# Patient Record
(demographics unavailable — no encounter records)

---

## 2025-06-04 NOTE — HISTORY OF PRESENT ILLNESS
[de-identified] : 65 year old black male referred by Dr Costa for monoclonal gammopathy , PMH of hypertension , hyperlipidemia , renal stones ? , CKD noted with igG( lambda ) paraproteinemia present for at least one year . M spike August 2022 : 1.8  Feb 2023 : 1.2  October 2023 : 1.2 , normal igA and igM . normal free light chain ratio.  Hgb > 12  He denies bone pain , B symptoms . recent CT abdomen showed renal cyst .  social history : retired , social alcohol  FH : father had lung cancer Surgery ; appendectomy . [de-identified] : 3/5/24: Pt presents for a follow-up regarding his monoclonal gammopathy. He underwent MR T/LS spine to r/o lytic lesions and were negative but showed multiple disc bulge. He continues to see Dr. Costa regularly and reports stable kidney functions. He has no new complaints today. He further denies fever, chill, SOB, CP, night sweat, weight changes, LE edema or blood in stool.   6/3/24: Pt presents for a follow-up regarding his monoclonal gammopathy. He underwent MR T/LS spine to r/o lytic lesions and were negative but showed multiple disc bulge. He continues to see Dr. Costa regularly and reports stable kidney functions. He has no new complaints today. He further denies fever, chill, SOB, CP, night sweat, weight changes, LE edema or blood in stool.

## 2025-06-04 NOTE — CONSULT LETTER
[Dear  ___] : Dear  [unfilled], [Consult Letter:] : I had the pleasure of evaluating your patient, [unfilled]. [Please see my note below.] : Please see my note below. [Consult Closing:] : Thank you very much for allowing me to participate in the care of this patient.  If you have any questions, please do not hesitate to contact me. [Sincerely,] : Sincerely, [FreeTextEntry3] : Dr. OSWALD Little [DrLali  ___] : Dr. EAST

## 2025-06-04 NOTE — PHYSICAL EXAM
[Fully active, able to carry on all pre-disease performance without restriction] : Status 0 - Fully active, able to carry on all pre-disease performance without restriction [Obese] : obese [Normal] : affect appropriate [de-identified] : Pleasnat male in no acute distress [de-identified] : Keloid noted on R neck and chest wall

## 2025-06-04 NOTE — PHYSICAL EXAM
[Fully active, able to carry on all pre-disease performance without restriction] : Status 0 - Fully active, able to carry on all pre-disease performance without restriction [Obese] : obese [Normal] : affect appropriate [de-identified] : Pleasnat male in no acute distress [de-identified] : Keloid noted on R neck and chest wall

## 2025-06-04 NOTE — ASSESSMENT
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  # IgG lambda gammopathy  --M spike was 1.6, IgG 2586 and normal K/L ratio of 1.09 --MR T/L/S spines show no lytic lesions but plasma cell disorder not ruled out completely, including smoldering myeloma or plasmacytoma   # Mild macrocytic anemia, possibly due to CKD   # CKD unknown etiology --baseline Cr of ~1.7 --possibly from HTN? but pt states BP is well-controlled   Plan:  --CBC today reviewed, hb stable at 13  --repeat myeloma panel including SPEP, IF, FLC, and immunoglobulin level  --if worsening MM panel will perform bone marrow bx --check iron studies, including ferritin, B12 and Folate today    RTC based on the above results.

## 2025-06-04 NOTE — HISTORY OF PRESENT ILLNESS
[de-identified] : 65 year old black male referred by Dr Costa for monoclonal gammopathy , PMH of hypertension , hyperlipidemia , renal stones ? , CKD noted with igG( lambda ) paraproteinemia present for at least one year . M spike August 2022 : 1.8  Feb 2023 : 1.2  October 2023 : 1.2 , normal igA and igM . normal free light chain ratio.  Hgb > 12  He denies bone pain , B symptoms . recent CT abdomen showed renal cyst .  social history : retired , social alcohol  FH : father had lung cancer Surgery ; appendectomy . [de-identified] : 3/5/24: Pt presents for a follow-up regarding his monoclonal gammopathy. He underwent MR T/LS spine to r/o lytic lesions and were negative but showed multiple disc bulge. He continues to see Dr. Costa regularly and reports stable kidney functions. He has no new complaints today. He further denies fever, chill, SOB, CP, night sweat, weight changes, LE edema or blood in stool.   6/3/24: Pt presents for a follow-up regarding his monoclonal gammopathy. He underwent MR T/LS spine to r/o lytic lesions and were negative but showed multiple disc bulge. He continues to see Dr. Costa regularly and reports stable kidney functions. He has no new complaints today. He further denies fever, chill, SOB, CP, night sweat, weight changes, LE edema or blood in stool.